# Patient Record
Sex: FEMALE | Race: WHITE | Employment: FULL TIME | URBAN - METROPOLITAN AREA
[De-identification: names, ages, dates, MRNs, and addresses within clinical notes are randomized per-mention and may not be internally consistent; named-entity substitution may affect disease eponyms.]

---

## 2023-06-06 RX ORDER — M-VIT,TX,IRON,MINS/CALC/FOLIC 27MG-0.4MG
1 TABLET ORAL DAILY
COMMUNITY

## 2023-06-06 RX ORDER — VALACYCLOVIR HYDROCHLORIDE 500 MG/1
TABLET, FILM COATED ORAL
COMMUNITY
Start: 2019-11-25

## 2023-06-06 NOTE — PROGRESS NOTES
Preoperative Instructions:    Stop eating solid foods at midnight the night prior to your surgery. Stop drinking clear liquids at midnight the night prior to your surgery. Arrive at the surgery center (3rd entrance) on ____9-2-83___________ by ___0945am or as directed by surgeons office.____________. Please stop any blood thinning medications as directed by your surgeon or prescribing physician. Failure to stop certain medications may interfere with your scheduled surgery. These may include: Aspirin, Coumadin, Plavix, NSAIDS (Motrin, Aleve, Advil, Mobic, Celebrex), Eliquis, Pradaxa, Xarelto, Fish oil, and herbal supplements. You may continue the rest of your medications through the night before surgery unless instructed otherwise. Day of surgery please take only the following medication(s) with a small sip of water: None      Please  shower with Hibiclens or antibacterial soap and water the day before and the morning of  the day of surgery. Reminders:  -If you are going home the day of your procedure, you will need a family member or friend to stay during the procedure and drive you home after your procedure. Your  must be 25years of age or older and able to sign off on your discharge instructions.    -If you are going home the same day of your surgery, someone must remain with you for the first 24 hours after your surgery if you receive sedation or anesthesia.      -Please do not wear any jewelery , lotions, contacts or body piercing the day of surgery

## 2023-06-08 ENCOUNTER — ANESTHESIA EVENT (OUTPATIENT)
Dept: OPERATING ROOM | Age: 58
End: 2023-06-08
Payer: COMMERCIAL

## 2023-06-08 NOTE — H&P
ORTHOPEDIC PREOP HISTORY AND PHYSICAL      HPI / Chief Complaint  Kyle Pompa is a 65 y.o. female who presents for left shoulder pain    Past Medical History  Ty Muhammad  has no past medical history on file. Past Surgical History  Ty Muhammad  has a past surgical history that includes  section; Tubal ligation; Dental surgery; and Colonoscopy. Current Medications  No current facility-administered medications for this encounter. Current Outpatient Medications   Medication Sig Dispense Refill    valACYclovir (VALTREX) 500 MG tablet TAKE 1 TABLET(500 MG) BY MOUTH DAILY      Multiple Vitamins-Minerals (THERAPEUTIC MULTIVITAMIN-MINERALS) tablet Take 1 tablet by mouth daily      Calcium Carbonate Antacid (CALCIUM CARBONATE PO) Take by mouth daily         Allergies  Allergies have been reviewed. Ty Muhammad is allergic to codeine. Social History  Ty Muhammad  reports that she has never smoked. She has never used smokeless tobacco. She reports current alcohol use. She reports that she does not use drugs. Family History  Emeli's family history is not on file. Review of Systems   History obtained from the patient. REVIEW OF SYSTEMS:   Constitution: negative for fever, chills    Physical Exam  Ht 5' 4\" (1.626 m)   Wt 170 lb (77.1 kg)   BMI 29.18 kg/m²    General Appearance: in no distress  HEENT: Normocephalic  CV: brisk cap refill to extremities  Lungs: Nonlabored breathing  Abdomen: soft  Extremities: Left shoulder skin intact    Assessment and Plan  Kyle Pompa is a 65 y.o. old female left shoulder adhesive capsulitis. Plan is for left shoulder manipulation under anesthesia, possible arthroscopic capsular release, labral debridement, possible biceps tenodesis.     This note is created with the assistance of a speech recognition program.  While intending to generate a document that actually reflects the content of the visit, the document can still have some errors including those of

## 2023-06-09 ENCOUNTER — ANESTHESIA (OUTPATIENT)
Dept: OPERATING ROOM | Age: 58
End: 2023-06-09
Payer: COMMERCIAL

## 2023-06-09 ENCOUNTER — HOSPITAL ENCOUNTER (OUTPATIENT)
Age: 58
Setting detail: OUTPATIENT SURGERY
Discharge: HOME | End: 2023-06-09
Attending: ORTHOPAEDIC SURGERY | Admitting: ORTHOPAEDIC SURGERY
Payer: COMMERCIAL

## 2023-06-09 VITALS
HEIGHT: 64 IN | DIASTOLIC BLOOD PRESSURE: 72 MMHG | RESPIRATION RATE: 12 BRPM | SYSTOLIC BLOOD PRESSURE: 104 MMHG | WEIGHT: 173 LBS | HEART RATE: 62 BPM | TEMPERATURE: 96.8 F | BODY MASS INDEX: 29.53 KG/M2 | OXYGEN SATURATION: 97 %

## 2023-06-09 DIAGNOSIS — G89.18 POST-OP PAIN: Primary | ICD-10-CM

## 2023-06-09 DIAGNOSIS — M75.02 ADHESIVE CAPSULITIS OF LEFT SHOULDER: Primary | ICD-10-CM

## 2023-06-09 PROCEDURE — 2580000003 HC RX 258: Performed by: ORTHOPAEDIC SURGERY

## 2023-06-09 PROCEDURE — 6360000002 HC RX W HCPCS

## 2023-06-09 PROCEDURE — 6360000002 HC RX W HCPCS: Performed by: ORTHOPAEDIC SURGERY

## 2023-06-09 PROCEDURE — 6360000002 HC RX W HCPCS: Performed by: ANESTHESIOLOGY

## 2023-06-09 PROCEDURE — 2500000003 HC RX 250 WO HCPCS: Performed by: NURSE ANESTHETIST, CERTIFIED REGISTERED

## 2023-06-09 PROCEDURE — 2580000003 HC RX 258: Performed by: ANESTHESIOLOGY

## 2023-06-09 PROCEDURE — 64415 NJX AA&/STRD BRCH PLXS IMG: CPT | Performed by: ANESTHESIOLOGY

## 2023-06-09 PROCEDURE — 2500000003 HC RX 250 WO HCPCS: Performed by: ANESTHESIOLOGY

## 2023-06-09 PROCEDURE — 6360000002 HC RX W HCPCS: Performed by: NURSE ANESTHETIST, CERTIFIED REGISTERED

## 2023-06-09 PROCEDURE — 6370000000 HC RX 637 (ALT 250 FOR IP)

## 2023-06-09 PROCEDURE — C9290 INJ, BUPIVACAINE LIPOSOME: HCPCS | Performed by: ANESTHESIOLOGY

## 2023-06-09 RX ORDER — SODIUM CHLORIDE 0.9 % (FLUSH) 0.9 %
5-40 SYRINGE (ML) INJECTION PRN
Status: DISCONTINUED | OUTPATIENT
Start: 2023-06-09 | End: 2023-06-09 | Stop reason: HOSPADM

## 2023-06-09 RX ORDER — BUPIVACAINE HYDROCHLORIDE 2.5 MG/ML
INJECTION, SOLUTION EPIDURAL; INFILTRATION; INTRACAUDAL
Status: COMPLETED | OUTPATIENT
Start: 2023-06-09 | End: 2023-06-09

## 2023-06-09 RX ORDER — NEOSTIGMINE METHYLSULFATE 5 MG/5 ML
SYRINGE (ML) INTRAVENOUS PRN
Status: DISCONTINUED | OUTPATIENT
Start: 2023-06-09 | End: 2023-06-09 | Stop reason: SDUPTHER

## 2023-06-09 RX ORDER — KETOROLAC TROMETHAMINE 30 MG/ML
INJECTION, SOLUTION INTRAMUSCULAR; INTRAVENOUS PRN
Status: DISCONTINUED | OUTPATIENT
Start: 2023-06-09 | End: 2023-06-09 | Stop reason: SDUPTHER

## 2023-06-09 RX ORDER — PROPOFOL 10 MG/ML
INJECTION, EMULSION INTRAVENOUS PRN
Status: DISCONTINUED | OUTPATIENT
Start: 2023-06-09 | End: 2023-06-09 | Stop reason: SDUPTHER

## 2023-06-09 RX ORDER — FENTANYL CITRATE 50 UG/ML
INJECTION, SOLUTION INTRAMUSCULAR; INTRAVENOUS PRN
Status: DISCONTINUED | OUTPATIENT
Start: 2023-06-09 | End: 2023-06-09 | Stop reason: SDUPTHER

## 2023-06-09 RX ORDER — FENTANYL CITRATE 50 UG/ML
INJECTION, SOLUTION INTRAMUSCULAR; INTRAVENOUS
Status: COMPLETED
Start: 2023-06-09 | End: 2023-06-09

## 2023-06-09 RX ORDER — GLYCOPYRROLATE 0.2 MG/ML
INJECTION INTRAMUSCULAR; INTRAVENOUS PRN
Status: DISCONTINUED | OUTPATIENT
Start: 2023-06-09 | End: 2023-06-09 | Stop reason: SDUPTHER

## 2023-06-09 RX ORDER — SODIUM CHLORIDE 0.9 % (FLUSH) 0.9 %
5-40 SYRINGE (ML) INJECTION EVERY 12 HOURS SCHEDULED
Status: DISCONTINUED | OUTPATIENT
Start: 2023-06-09 | End: 2023-06-09 | Stop reason: HOSPADM

## 2023-06-09 RX ORDER — DEXAMETHASONE SODIUM PHOSPHATE 10 MG/ML
10 INJECTION, SOLUTION INTRAMUSCULAR; INTRAVENOUS ONCE
Status: COMPLETED | OUTPATIENT
Start: 2023-06-09 | End: 2023-06-09

## 2023-06-09 RX ORDER — METOCLOPRAMIDE HYDROCHLORIDE 5 MG/ML
10 INJECTION INTRAMUSCULAR; INTRAVENOUS
Status: DISCONTINUED | OUTPATIENT
Start: 2023-06-09 | End: 2023-06-09 | Stop reason: HOSPADM

## 2023-06-09 RX ORDER — ACETAMINOPHEN 500 MG
1000 TABLET ORAL ONCE
Status: COMPLETED | OUTPATIENT
Start: 2023-06-09 | End: 2023-06-09

## 2023-06-09 RX ORDER — MEPERIDINE HYDROCHLORIDE 50 MG/ML
12.5 INJECTION INTRAMUSCULAR; INTRAVENOUS; SUBCUTANEOUS ONCE
Status: DISCONTINUED | OUTPATIENT
Start: 2023-06-09 | End: 2023-06-09 | Stop reason: HOSPADM

## 2023-06-09 RX ORDER — SODIUM CHLORIDE 9 MG/ML
INJECTION, SOLUTION INTRAVENOUS PRN
Status: DISCONTINUED | OUTPATIENT
Start: 2023-06-09 | End: 2023-06-09 | Stop reason: HOSPADM

## 2023-06-09 RX ORDER — BUPIVACAINE HYDROCHLORIDE 2.5 MG/ML
INJECTION, SOLUTION EPIDURAL; INFILTRATION; INTRACAUDAL
Status: COMPLETED
Start: 2023-06-09 | End: 2023-06-09

## 2023-06-09 RX ORDER — LIDOCAINE HYDROCHLORIDE 10 MG/ML
INJECTION, SOLUTION INFILTRATION; PERINEURAL PRN
Status: DISCONTINUED | OUTPATIENT
Start: 2023-06-09 | End: 2023-06-09 | Stop reason: SDUPTHER

## 2023-06-09 RX ORDER — OXYCODONE HYDROCHLORIDE 5 MG/1
10 TABLET ORAL PRN
Status: DISCONTINUED | OUTPATIENT
Start: 2023-06-09 | End: 2023-06-09 | Stop reason: HOSPADM

## 2023-06-09 RX ORDER — SODIUM CHLORIDE, SODIUM LACTATE, POTASSIUM CHLORIDE, CALCIUM CHLORIDE 600; 310; 30; 20 MG/100ML; MG/100ML; MG/100ML; MG/100ML
INJECTION, SOLUTION INTRAVENOUS CONTINUOUS
Status: DISCONTINUED | OUTPATIENT
Start: 2023-06-09 | End: 2023-06-09 | Stop reason: HOSPADM

## 2023-06-09 RX ORDER — MORPHINE SULFATE 2 MG/ML
1 INJECTION, SOLUTION INTRAMUSCULAR; INTRAVENOUS EVERY 5 MIN PRN
Status: DISCONTINUED | OUTPATIENT
Start: 2023-06-09 | End: 2023-06-09 | Stop reason: HOSPADM

## 2023-06-09 RX ORDER — ACETAMINOPHEN 500 MG
TABLET ORAL
Status: COMPLETED
Start: 2023-06-09 | End: 2023-06-09

## 2023-06-09 RX ORDER — OXYCODONE HYDROCHLORIDE AND ACETAMINOPHEN 5; 325 MG/1; MG/1
1-2 TABLET ORAL EVERY 6 HOURS PRN
Qty: 40 TABLET | Refills: 0 | Status: SHIPPED | OUTPATIENT
Start: 2023-06-09 | End: 2023-06-16

## 2023-06-09 RX ORDER — ONDANSETRON 2 MG/ML
4 INJECTION INTRAMUSCULAR; INTRAVENOUS
Status: DISCONTINUED | OUTPATIENT
Start: 2023-06-09 | End: 2023-06-09 | Stop reason: HOSPADM

## 2023-06-09 RX ORDER — MIDAZOLAM HYDROCHLORIDE 1 MG/ML
INJECTION INTRAMUSCULAR; INTRAVENOUS
Status: COMPLETED
Start: 2023-06-09 | End: 2023-06-09

## 2023-06-09 RX ORDER — IBUPROFEN 800 MG/1
800 TABLET ORAL
Qty: 90 TABLET | Refills: 0 | Status: SHIPPED | OUTPATIENT
Start: 2023-06-09

## 2023-06-09 RX ORDER — OXYCODONE HYDROCHLORIDE 5 MG/1
5 TABLET ORAL PRN
Status: DISCONTINUED | OUTPATIENT
Start: 2023-06-09 | End: 2023-06-09 | Stop reason: HOSPADM

## 2023-06-09 RX ORDER — ONDANSETRON 2 MG/ML
INJECTION INTRAMUSCULAR; INTRAVENOUS PRN
Status: DISCONTINUED | OUTPATIENT
Start: 2023-06-09 | End: 2023-06-09 | Stop reason: SDUPTHER

## 2023-06-09 RX ORDER — LIDOCAINE HYDROCHLORIDE 10 MG/ML
1 INJECTION, SOLUTION INFILTRATION; PERINEURAL
Status: DISCONTINUED | OUTPATIENT
Start: 2023-06-09 | End: 2023-06-09 | Stop reason: HOSPADM

## 2023-06-09 RX ORDER — ONDANSETRON 4 MG/1
4 TABLET, FILM COATED ORAL EVERY 6 HOURS PRN
Qty: 21 TABLET | Refills: 0 | Status: SHIPPED | OUTPATIENT
Start: 2023-06-09 | End: 2023-06-16

## 2023-06-09 RX ORDER — DEXAMETHASONE SODIUM PHOSPHATE 10 MG/ML
INJECTION, SOLUTION INTRAMUSCULAR; INTRAVENOUS
Status: COMPLETED
Start: 2023-06-09 | End: 2023-06-09

## 2023-06-09 RX ORDER — DIPHENHYDRAMINE HYDROCHLORIDE 50 MG/ML
12.5 INJECTION INTRAMUSCULAR; INTRAVENOUS
Status: DISCONTINUED | OUTPATIENT
Start: 2023-06-09 | End: 2023-06-09 | Stop reason: HOSPADM

## 2023-06-09 RX ORDER — CEFAZOLIN 2 G/1
INJECTION, POWDER, FOR SOLUTION INTRAMUSCULAR; INTRAVENOUS
Status: DISCONTINUED
Start: 2023-06-09 | End: 2023-06-09 | Stop reason: HOSPADM

## 2023-06-09 RX ORDER — ROCURONIUM BROMIDE 10 MG/ML
INJECTION, SOLUTION INTRAVENOUS PRN
Status: DISCONTINUED | OUTPATIENT
Start: 2023-06-09 | End: 2023-06-09 | Stop reason: SDUPTHER

## 2023-06-09 RX ORDER — HYDRALAZINE HYDROCHLORIDE 20 MG/ML
10 INJECTION INTRAMUSCULAR; INTRAVENOUS
Status: DISCONTINUED | OUTPATIENT
Start: 2023-06-09 | End: 2023-06-09 | Stop reason: HOSPADM

## 2023-06-09 RX ADMIN — FENTANYL CITRATE 100 MCG: 50 INJECTION, SOLUTION INTRAMUSCULAR; INTRAVENOUS at 11:09

## 2023-06-09 RX ADMIN — ACETAMINOPHEN 1000 MG: 500 TABLET ORAL at 10:43

## 2023-06-09 RX ADMIN — Medication 5 MG: at 13:30

## 2023-06-09 RX ADMIN — KETOROLAC TROMETHAMINE 30 MG: 30 INJECTION, SOLUTION INTRAMUSCULAR; INTRAVENOUS at 13:25

## 2023-06-09 RX ADMIN — BUPIVACAINE HYDROCHLORIDE 10 ML: 2.5 INJECTION, SOLUTION EPIDURAL; INFILTRATION; INTRACAUDAL; PERINEURAL at 11:13

## 2023-06-09 RX ADMIN — FENTANYL CITRATE 50 MCG: 50 INJECTION, SOLUTION INTRAMUSCULAR; INTRAVENOUS at 12:41

## 2023-06-09 RX ADMIN — SODIUM CHLORIDE, POTASSIUM CHLORIDE, SODIUM LACTATE AND CALCIUM CHLORIDE: 600; 310; 30; 20 INJECTION, SOLUTION INTRAVENOUS at 10:56

## 2023-06-09 RX ADMIN — FENTANYL CITRATE 50 MCG: 50 INJECTION, SOLUTION INTRAMUSCULAR; INTRAVENOUS at 13:44

## 2023-06-09 RX ADMIN — MIDAZOLAM HYDROCHLORIDE 2 MG: 1 INJECTION, SOLUTION INTRAMUSCULAR; INTRAVENOUS at 11:09

## 2023-06-09 RX ADMIN — DEXAMETHASONE SODIUM PHOSPHATE 10 MG: 10 INJECTION, SOLUTION INTRAMUSCULAR; INTRAVENOUS at 10:56

## 2023-06-09 RX ADMIN — DEXAMETHASONE SODIUM PHOSPHATE 10 MG: 10 INJECTION INTRAMUSCULAR; INTRAVENOUS at 10:56

## 2023-06-09 RX ADMIN — GLYCOPYRROLATE 0.4 MG: 0.2 INJECTION INTRAMUSCULAR; INTRAVENOUS at 13:30

## 2023-06-09 RX ADMIN — ROCURONIUM BROMIDE 40 MG: 10 INJECTION, SOLUTION INTRAVENOUS at 12:41

## 2023-06-09 RX ADMIN — ONDANSETRON 4 MG: 2 INJECTION INTRAMUSCULAR; INTRAVENOUS at 13:25

## 2023-06-09 RX ADMIN — BUPIVACAINE 10 ML: 13.3 INJECTION, SUSPENSION, LIPOSOMAL INFILTRATION at 11:13

## 2023-06-09 RX ADMIN — Medication 1000 MG: at 10:43

## 2023-06-09 RX ADMIN — CEFAZOLIN 2000 MG: 2 INJECTION, POWDER, FOR SOLUTION INTRAMUSCULAR; INTRAVENOUS at 12:53

## 2023-06-09 RX ADMIN — LIDOCAINE HYDROCHLORIDE 40 MG: 10 INJECTION, SOLUTION INFILTRATION; PERINEURAL at 12:41

## 2023-06-09 RX ADMIN — PROPOFOL 150 MG: 10 INJECTION, EMULSION INTRAVENOUS at 12:41

## 2023-06-09 ASSESSMENT — PAIN DESCRIPTION - DESCRIPTORS: DESCRIPTORS: ACHING

## 2023-06-09 ASSESSMENT — PAIN - FUNCTIONAL ASSESSMENT: PAIN_FUNCTIONAL_ASSESSMENT: 0-10

## 2023-06-09 NOTE — OP NOTE
Operative Note      Patient: Kelsey Traylor  YOB: 1965  MRN: [id number]    Date of Procedure: 6/9/2023    Pre-Op Diagnosis Codes:     * Adhesive capsulitis of left shoulder [M75.02]     * Degenerative superior labral anterior-to-posterior (SLAP) tear of left shoulder [S43.432A]    Post-Op Diagnosis: Adhesive capsulitis left shoulder       Procedure(s):  LEFT SHOULDER MANIPULATION UNDER ANESTHESIA WITH PRE-OP CPNB   ARTHROSCOPIC CAPSULAR RELEASE      Surgeon(s):  Delaney Romero MD    Assistant:   First Assistant: Wm Lind RN    Anesthesia: General    Estimated Blood Loss (mL): Minimal    Complications: None    Specimens:   * No specimens in log *    Implants:  * No implants in log *      Drains: * No LDAs found *    Findings: See below      Detailed Description of Procedure:   Informed consent was obtained. Marked her left shoulder. She received a preoperative regional nerve block. She was brought back to the operating room where general anesthesia was smoothly induced. She was placed in the beachchair position. The left arm was prepped and draped in normal sterile fashion. I performed an examination under anesthesia and her preoperative forward elevation and abduction was limited to 90 degrees. Even just with a gentle exam and positioning her I was able to feel inflamed capsular tissue tearing. A timeout is performed. She did receive prophylactic antibiotics. I made a standard posterior viewing portal in the glenohumeral joint. I immediately saw significant inflamed anterior capsular tissue. The shoulder was tight. I performed a diagnostic arthroscopy. Her biceps tendon was of good quality and was intact at the superior labral anchor. The superior labrum itself was intact and there was no instability of the biceps anchor. The articular cartilage of the glenoid and humeral head was intact. I could not see the subscapularis anteriorly due to significant inflamed tissue.   The

## 2023-06-09 NOTE — ANESTHESIA POSTPROCEDURE EVALUATION
POST- ANESTHESIA EVALUATION       Pt Name: Tucker Lloyd  MRN: [id number]  [place]: 1965  Date of evaluation: 6/9/2023  Time:  2:11 PM      /78   Pulse 60   Temp 96.8 °F (36 °C) (Temporal)   Resp 18   Ht 5' 4\" (1.626 m)   Wt 173 lb (78.5 kg)   SpO2 99%   BMI 29.70 kg/m²      Consciousness Level  Awake  Cardiopulmonary Status  Stable  Pain Adequately Treated YES  Nausea / Vomiting  NO  Adequate Hydration  YES  Anesthesia Related Complications NONE      Electronically signed by Tobias Jean MD on 6/9/2023 at 2:11 PM       Department of Anesthesiology  Postprocedure Note    Patient: Tucker Lloyd  MRN: [id number]  [place]: 1965  Date of evaluation: 6/9/2023      Procedure Summary     Date: 06/09/23 Room / Location: [facility] OR 02 / [address] [facility]    Anesthesia Start: 4425 Anesthesia Stop: 1848    Procedure: LEFT SHOULDER MANIPULATION UNDER ANESTHESIA WITH PRE-OP CPNB   ARTHROSCOPIC CAPSULAR RELEASE   (Left: Shoulder) Diagnosis:       Adhesive capsulitis of left shoulder      Degenerative superior labral anterior-to-posterior (SLAP) tear of left shoulder      (Adhesive capsulitis of left shoulder [M75.02])      (Degenerative superior labral anterior-to-posterior (SLAP) tear of left shoulder [H59.862F])    Surgeons: Jesus Atwood MD Responsible Provider: Tobias Jean MD    Anesthesia Type: general ASA Status: 1          Anesthesia Type: No value filed.     Marisa Phase I: Marisa Score: 8    Marisa Phase II:        Anesthesia Post Evaluation

## 2023-06-09 NOTE — ANESTHESIA PROCEDURE NOTES
Peripheral Block    Patient location during procedure: pre-op  Reason for block: post-op pain management and at surgeon's request  Start time: 6/9/2023 11:13 AM  End time: 6/9/2023 11:17 AM  Staffing  Performed: anesthesiologist   Anesthesiologist: Silvano Cain MD  Preanesthetic Checklist  Completed: patient identified, IV checked, site marked, risks and benefits discussed, surgical/procedural consents, equipment checked, pre-op evaluation, timeout performed, anesthesia consent given, oxygen available, monitors applied/VS acknowledged, fire risk safety assessment completed and verbalized and blood product R/B/A discussed and consented  Peripheral Block   Patient position: supine  Prep: ChloraPrep  Provider prep: mask and sterile gloves  Patient monitoring: cardiac monitor, continuous pulse ox, frequent blood pressure checks, IV access, oxygen and responsive to questions  Block type: Brachial plexus  Interscalene  Laterality: left  Injection technique: single-shot  Guidance: transarterial technique  Local infiltration: bupivacaine  Infiltration strength: 0.25 %  Local infiltration: bupivacaine  Dose: 2 mL    Needle   Needle type: insulated echogenic nerve stimulator needle   Needle gauge: 22 G  Needle localization: anatomical landmarks and ultrasound guidance  Needle insertion depth: 2 cm  Test dose: negative  Needle length: 5 cm  Assessment   Injection assessment: negative aspiration for heme, no paresthesia on injection, local visualized surrounding nerve on ultrasound and no intravascular symptoms  Paresthesia pain: none  Slow fractionated injection: yes  Hemodynamics: stable  Outcomes: uncomplicated and patient tolerated procedure well    Medications Administered  bupivacaine (MARCAINE) PF injection 0.25% - Interscalene, Shoulder Left   10 mL - 6/9/2023 11:13:00 AM  bupivacaine liposome (EXPAREL) injection 1.3% - Perineural, Shoulder Left   10 mL - 6/9/2023 11:13:00 AM

## 2023-06-09 NOTE — ANESTHESIA PRE PROCEDURE
HGB, HCT, MCV, RDW, PLT    CMP: No results found for: NA, K, CL, CO2, BUN, CREATININE, GFRAA, AGRATIO, LABGLOM, GLUCOSE, GLU, PROT, CALCIUM, BILITOT, ALKPHOS, AST, ALT    POC Tests: No results for input(s): POCGLU, POCNA, POCK, POCCL, POCBUN, POCHEMO, POCHCT in the last 72 hours. Coags: No results found for: PROTIME, INR, APTT    HCG (If Applicable): No results found for: PREGTESTUR, PREGSERUM, HCG, HCGQUANT     ABGs: No results found for: PHART, PO2ART, NNU5YDM, DBK1DQT, BEART, A6GBRSMO     Type & Screen (If Applicable):  No results found for: LABABO, LABRH    Drug/Infectious Status (If Applicable):  No results found for: HIV, HEPCAB    COVID-19 Screening (If Applicable): No results found for: COVID19        Anesthesia Evaluation  Patient summary reviewed and Nursing notes reviewed no history of anesthetic complications:   Airway: Mallampati: II  TM distance: >3 FB   Neck ROM: full  Mouth opening: > = 3 FB   Dental: normal exam         Pulmonary:Negative Pulmonary ROS and normal exam  breath sounds clear to auscultation                             Cardiovascular:Negative CV ROS  Exercise tolerance: no interval change,         ECG reviewed  Rhythm: regular  Rate: normal                    Neuro/Psych:   Negative Neuro/Psych ROS              GI/Hepatic/Renal: Neg GI/Hepatic/Renal ROS            Endo/Other: Negative Endo/Other ROS                     ROS comment: Adhesive capsulitis of left shoulder  Degenerative superior labral anterior-to-posterior (SLAP) tear of left shoulder  Abdominal: normal exam      Abdomen: soft. Vascular: negative vascular ROS. Other Findings:           Anesthesia Plan      general     ASA 1     (GA and interscalene nerve block)  Induction: intravenous. MIPS: Postoperative opioids intended and Prophylactic antiemetics administered. Anesthetic plan and risks discussed with patient. Plan discussed with CRNA.           Post-op pain plan if not by surgeon: single

## 2023-06-09 NOTE — DISCHARGE INSTRUCTIONS
Wear the sling until your nerve block wears off and you regain control of your arm. Then try to wear the sling as little as possible. Work on range of motion multiple times a day doing passive range of motion exercises including cane exercises raising the arm overhead. Get into physical therapy as soon as possible. I will see in 2 weeks. Activity  You have had anesthesia today  Do not drive, operate heavy equipment, consume alcoholic beverages, or make any important decisions  for 24 hours   If you are taking pain medication: Do not drive or consume alcohol. Take your time changing positions today. You may feel light headed or dizzy if you move too quickly. Continue your home medications as ordered by your physician. Diet   You can eat your normal diet when you feel well. You should start off with bland foods like chicken soup, toast, or yogurt. Then advance as tolerated. Drink plenty of fluids (unless your doctor tells you not to). Your urine should be very lightly colored without a strong odor.

## 2023-06-19 ENCOUNTER — HOSPITAL ENCOUNTER (OUTPATIENT)
Dept: PHYSICAL THERAPY | Facility: CLINIC | Age: 58
Setting detail: THERAPIES SERIES
Discharge: HOME | End: 2023-06-19
Payer: COMMERCIAL

## 2023-06-19 PROCEDURE — 97016 VASOPNEUMATIC DEVICE THERAPY: CPT

## 2023-06-19 PROCEDURE — 97110 THERAPEUTIC EXERCISES: CPT

## 2023-06-19 NOTE — FLOWSHEET NOTE
stiff\", but denies any increased pain after last session. Objective:  Modalities: MHP pre, vaso to L shoulder: 34 deg, med seated  post   Precautions: s/p L shoulder capsular release, NEHAL 6/12/23 for [facility]  Exercises: pt to be seen every day for x2 weeks to start   Exercise Reps/ Time Weight/ Level Comments COMPLETED   L GHJ AP glides in neutral, inferior glides @ <45 deg, long-axis distraction pre PROM       x   L shoulder PROM- all motions x20 ea     x               Gym           Circular Pendulum  15x ea     x   Shoulder ext 2x10  W/dowel x   Pulleys 2' ea  Flex,abd x   Wall Slide 10x ea  Flex,ABD x   Standing wand flex and abd 10xea 1# Added 6/19 x   ER with wand 5\"X10             Seated       Scapular retraction 2x10      Towel slides 15x  Flexion,scaption, abd           Supine       Shoulder flexion 2x10  wand x   ER 2x10  wand x                                 Specific Instructions for next treatment: assess tolerance to last visit, progress ROM activities as tolerated. Treatment Charges: Mins Units   [x]  Modalities MHP 5 0   [x]  Ther Exercise 30 2   []  Manual Therapy     []  Ther Activities     []  Neuro Re-ed     [x]  Vasocompression 15 1   [] Gait     []  Other     Total Treatment time 45 3     Assessment: [x] Progressing toward goals. Again began treatment with 5' of MHP to decrease stiffness prior to exercises. Continued with pulleys followed by wall slides with good tolerance. Progressed to standing wand flex and abd, as well as abd in supine, with most difficulty during abd. Continued with PROM, implementing contract/relax for ER with improvement in ER noted post. Ended with vasocompression to decrease post exercise soreness and edema. [] No change.      [] Other:  [x] Patient would continue to benefit from skilled physical therapy services in order to: inc L shoulder A/PROM; inc L RTC gross strength; and overall improve tolerance for utilizing L UE in ADLs- donning/doffing clothing etc.

## 2023-06-20 ENCOUNTER — HOSPITAL ENCOUNTER (OUTPATIENT)
Dept: PHYSICAL THERAPY | Facility: CLINIC | Age: 58
Setting detail: THERAPIES SERIES
Discharge: HOME | End: 2023-06-20
Payer: COMMERCIAL

## 2023-06-20 PROCEDURE — 97110 THERAPEUTIC EXERCISES: CPT

## 2023-06-20 NOTE — FLOWSHEET NOTE
[] [facility] [address]  [address]  [phone]  F: [phone] [] [address]  [facility]   [address]  P: [phone]  F: [phone] [] [address]  [address] [facility]  P: [phone]  F: [phone] [x] [address] [facility] [address] [facility] [address]: [phone]  F: [phone] [] [address]  [facility]   [address]   P: [phone]  F: [phone]  [] [address] [facility] [place] [facility].   P: [phone]  F: [phone] [] [address] [facility] [address]  P: [phone]  F: [phone] [] [address]  [address] [facility]  [place]: [phone]  F: [phone] [] [facility] [address]  [place]: [phone]  F: [phone]      Physical Therapy Daily Treatment Note    Date:  2023  Patient Name:  Martinez Green    :  1965  MRN: [id number]  Physician: Demetria Sapp MD                              Insurance: [address] [place] [address] Diagnosis: M75.02 (ICD-10-CM) - Adhesive capsulitis of left shoulder                  Rehab Codes: M75.02 (ICD-10-CM) - Adhesive capsulitis of left shoulder  M25.512; M62.81  Onset Date: 23                 Next 's appt: 23   Visit# / total visits:      Cancels/No Shows: 0/0    Subjective:    Pain:  [x] Yes  [] No Location: Left shoulder  Pain Rating: (0-10 scale) 0/10 at rest, 3/10 during mobility  Pain altered Tx:  [x] No  [] Yes  Action:   Comments: Pt reports overall L shld has been feeling better since beginning PT.

## 2023-06-21 ENCOUNTER — APPOINTMENT (OUTPATIENT)
Dept: PHYSICAL THERAPY | Facility: CLINIC | Age: 58
End: 2023-06-21
Payer: COMMERCIAL

## 2023-06-22 ENCOUNTER — HOSPITAL ENCOUNTER (OUTPATIENT)
Dept: PHYSICAL THERAPY | Facility: CLINIC | Age: 58
Setting detail: THERAPIES SERIES
Discharge: HOME | End: 2023-06-22
Payer: COMMERCIAL

## 2023-06-22 PROCEDURE — 97110 THERAPEUTIC EXERCISES: CPT

## 2023-06-22 NOTE — FLOWSHEET NOTE
[] [facility] [address]  [address]  [phone]  F: [phone] [] [address]  [facility]   [address]  P: [phone]  F: [phone] [] [address]  [address] [facility]  P: [phone]  F: [phone] [x] [address] [facility] [address]: [phone]  F: [phone] [] [address]  [facility]   [address]   P: [phone]  F: [phone]  [] [address] [facility]   P: [phone]  F: [phone] [] [address] [facility]   [address]  P: [phone]  F: [phone] [] [address]  [address] [facility]  [place]: [phone]  F: [phone] [] [facility] [address]   [place]: [phone]  F: [phone]      Physical Therapy Daily Treatment Note    Date:  2023  Patient Name:  Afia Arshad    :  1965  MRN: [id number]  Physician: Janneth Rich MD                              Insurance: [address] [place] [address] Diagnosis: M75.02 (ICD-10-CM) - Adhesive capsulitis of left shoulder                  Rehab Codes: M75.02 (ICD-10-CM) - Adhesive capsulitis of left shoulder  M25.512; M62.81  Onset Date: 23                 Next 's appt: 23   Visit# / total visits:      Cancels/No Shows: 0/0    Subjective:    Pain:  [x] Yes  [] No Location: Left shoulder  Pain Rating: (0-10 scale) 0/10 at rest, 3/10 during mobility  Pain altered Tx:  [x] No  [] Yes  Action:   Comments: Pt reported that her doctor is pleased with her progress, noted she

## 2023-06-23 ENCOUNTER — APPOINTMENT (OUTPATIENT)
Dept: PHYSICAL THERAPY | Facility: CLINIC | Age: 58
End: 2023-06-23
Payer: COMMERCIAL

## 2023-06-26 ENCOUNTER — HOSPITAL ENCOUNTER (OUTPATIENT)
Dept: PHYSICAL THERAPY | Facility: CLINIC | Age: 58
Setting detail: THERAPIES SERIES
Discharge: HOME | End: 2023-06-26
Payer: COMMERCIAL

## 2023-06-26 PROCEDURE — 97110 THERAPEUTIC EXERCISES: CPT

## 2023-06-29 ENCOUNTER — HOSPITAL ENCOUNTER (OUTPATIENT)
Dept: PHYSICAL THERAPY | Facility: CLINIC | Age: 58
Setting detail: THERAPIES SERIES
Discharge: HOME | End: 2023-06-29
Payer: COMMERCIAL

## 2023-06-29 PROCEDURE — 97110 THERAPEUTIC EXERCISES: CPT

## 2023-07-05 ENCOUNTER — HOSPITAL ENCOUNTER (OUTPATIENT)
Dept: PHYSICAL THERAPY | Facility: CLINIC | Age: 58
Setting detail: THERAPIES SERIES
Discharge: HOME | End: 2023-07-05
Payer: COMMERCIAL

## 2023-07-05 PROCEDURE — 97110 THERAPEUTIC EXERCISES: CPT

## 2023-07-05 NOTE — FLOWSHEET NOTE
mobility, sometime last week- attributes to \"beginning strength training. \"  Reports constant ache- even at rest- since the flare-up began. However, also reports only utilizing x2 ibuprofen each day as of recent- as compared to x3 per day. Plans to follow-up with referring MD 7/20/23- however- may call to reschedule follow-up for sooner. Utilized cryotherapy a few times while at home since flare-up. Only performed wall-slides and pulleys over the weekend per instruction of PT.      Objective:  Modalities: MHP to L shoulder in supine for x5' mid-session prior to PROM; HELD VASO- 7/5/23   Precautions: s/p L shoulder capsular release, NEHAL 6/12/23 for [facility] [place] operated by [facility]; x2 week for remainder of PT POC- starting 7/5/23   Exercises:  Exercise Reps/ Time Weight/ Level Comments COMPLETED   Goal update, UEFS, HEP/PT POC education     x          L GHJ AP glides in neutral, inferior glides @ <45 deg, long-axis distraction pre PROM     MAY RESUME NEXT VISIT -   L shoulder PROM- all motions x10 ea     x               Gym           Circular Pendulum 15x ea     -   UBE  x3'/x3' L2  x   Shoulder ext 2x10  W/dowel -   AAROM Pulleys x2' ea  Flex, scap, abd  Cues for slow, controlled motion + hold at end-ranges  x   Seated SB roll-outs  x10 ea Large red NEW- 7/5/23  FLEX/HABD/HADD x   Wall Slide 10x ea  Flex,ABD -   Standing AAROM wand FLEX/SCAPT/ABD 2x10 ea 1# NEW- 7/5/23   Mirror for visual feedback x   ER with wand 5\"X10   -   IR towel str  3x30\"   HELD   Tband extensions, rows x20 ea Red tubing  NEW- 7/5/23   Cues to reduce HABD, for in turn, dec biceps P x    TB ER/IR iso walk-out  x10 ea Red tubing  NEW- 7/5/23  Reports appropriate burn  x   IYT 10x ea   HELD          Seated       Scapular retraction 2x10   -   Towel slides 15x  Flexion,scaption, abd -          Supine       Shoulder flexion 2x10  wand -   ER 2x10  wand -   1/2 foam roller pec stretch  2x1'  T 2x, Y only 1x HELD  Pt reported sig P/symptoms             L shoulder PROM:

## 2023-07-05 NOTE — PROGRESS NOTES
[] [facility] - [facility] &  Therapy  [address]  [phone]  F: [phone] [] [address] [facility]  [address]   [address]  P: [phone]  F: [phone] [] [address] &  Therapy  [address] [facility]  P: [phone]  F: [phone] [x] [facility]  P: [phone]  F: [phone] [] [address] [facility]  [address]   [address]   [place]: [phone]  F: [phone]      Physical Therapy Progress Note    Date: 2023      Patient: Sarah Ornelas  : 1965  MRN: [id number]    Physician: Patrick Godinez MD                              Insurance: [address] [place] [address] Diagnosis: M75.02 (ICD-10-CM) - Adhesive capsulitis of left shoulder                  Rehab Codes: M75.02 (ICD-10-CM) - Adhesive capsulitis of left shoulder  M25.512; M62.81  Onset Date: 23                 Next 's appt: 23   Visit# / total visits:                     Cancels/No Shows: 0/0    Subjective:    Pain:  [x] Yes  [] No   Location: L GHJ- generalized             Pain Rating: (0-10 scale) 2/10 at rest  Pain altered Tx:  [x] No  [] Yes  Action: N/A  Comments:      Reports experiencing a flare-up in her symptoms, with then limited functional mobility, sometime last week- attributes to \"beginning strength training. \"  Reports constant ache- even at rest- since the flare-up began. However, also reports only utilizing x2 ibuprofen each day as of recent- as compared to x3 per day. Plans to follow-up with referring MD 23- however- may call to reschedule follow-up for sooner. Utilized cryotherapy a few times while at home since flare-up. Only performed wall-slides and pulleys over the weekend per instruction of PT.      Assessment:  Pt presents with trace

## 2023-07-07 ENCOUNTER — HOSPITAL ENCOUNTER (OUTPATIENT)
Dept: PHYSICAL THERAPY | Facility: CLINIC | Age: 58
Setting detail: THERAPIES SERIES
Discharge: HOME | End: 2023-07-07
Payer: COMMERCIAL

## 2023-07-07 PROCEDURE — 97110 THERAPEUTIC EXERCISES: CPT

## 2023-07-07 NOTE — FLOWSHEET NOTE
Verbalizes understanding. [] Demonstrates understanding. [] Needs review. [] Demonstrates/verbalizes HEP/Ed previously given. Access Code: [email or web address]  URL: [email or web address]  Date: 06/12/2023  Prepared by: Grover Moreira     Exercises  - Circular Shoulder Pendulum with Table Support  - 3 x daily - 7 x weekly - 1 sets - 10 reps  - Seated Scapular Retraction  - 3 x daily - 7 x weekly - 2 sets - 10 reps - 5 sec hold  - Seated Bilateral Shoulder Flexion Towel Slide at Table Top  - 3 x daily - 7 x weekly - 2 sets - 10 reps  - Seated Shoulder Scaption Slide at Table Top with Forearm in Neutral  - 3 x daily - 7 x weekly - 2 sets - 10 reps  - Seated Shoulder Abduction Towel Slide at Table Top  - 3 x daily - 7 x weekly - 2 sets - 10 reps  - Standing Shoulder Extension with Dowel  - 3 x daily - 7 x weekly - 2 sets - 10 reps  - Supine Shoulder Flexion with Dowel  - 3 x daily - 7 x weekly - 2 sets - 10 reps  - Supine Shoulder External Rotation in 45 Degrees Abduction AAROM with Dowel  - 3 x daily - 7 x weekly - 2 sets - 10 reps  - Putty Squeezes  - 3 x daily - 7 x weekly - 2 sets - 10 reps    7/5/23- See grid above for details. Plan: [x] Continue current frequency toward long and short term goals. [x] Specific Instructions for subsequent treatments: Follow-up with pt regarding tolerance to last, modified treatment session- continue if proven beneficial next visit. Abstain from foam roller pec stretching, as well as more advanced scapular strengthening prone on mat table or over SB for now secondary to poor pt tolerance. Continue gentle strengthening and resume joint mobs, vaso next visit.      Frequency: 3 x/week for 24 visits      Time In: 4:00pm      Time Out: 4:40pm    Electronically signed by:  Wen Gore PTA

## 2023-07-10 ENCOUNTER — HOSPITAL ENCOUNTER (OUTPATIENT)
Dept: PHYSICAL THERAPY | Facility: CLINIC | Age: 58
Setting detail: THERAPIES SERIES
Discharge: HOME | End: 2023-07-10
Payer: COMMERCIAL

## 2023-07-10 PROCEDURE — 97110 THERAPEUTIC EXERCISES: CPT

## 2023-07-10 NOTE — FLOWSHEET NOTE
[] [address]  4600 [facility].  [phone]  F: [phone] [] 204 [facility]  [address]   [address]  P: [phone]  F: [phone] [] [address]  151 [facility]  P: [phone]  F: [phone] [x] [facility]: [phone]  F: [phone] [] 224 [facility]  One [facility]   [address]   P: [phone]  F: [phone]  [] 7170 [facility].   P: [phone]  F: [phone] [] 205 [facility]  [address]  P: [phone]  F: [phone] [] 224 [facility]  7[address] [facility]  [place]: [phone]  F: [phone] [] 1 [facility] [address]  [place]: [phone]  F: [phone]      Physical Therapy Daily Treatment Note    Date:  7/10/2023  Patient Name:  Neema Fay    :  1965  MRN: [id number]  Physician: Geoffrey Bedolla MD                              Insurance: [address] [place] [address] Diagnosis: M75.02 (ICD-10-CM) - Adhesive capsulitis of left shoulder                  Rehab Codes: M75.02 (ICD-10-CM) - Adhesive capsulitis of left shoulder  M25.512; M62.81  Onset Date: 23                 Next 's appt: 23   Visit# / total visits:      Cancels/No Shows: 0/0    Subjective:    Pain:  [x] Yes  [] No Location: L GHJ- generalized  Pain Rating: (0-10 scale) (unrated)/10 at rest  Pain altered Tx:  [x] No  [] Yes  Action: N/A  Comments: Pt reports she thinks the steroids are kicking in.  Denies much pain upon

## 2023-07-12 ENCOUNTER — HOSPITAL ENCOUNTER (OUTPATIENT)
Dept: PHYSICAL THERAPY | Facility: CLINIC | Age: 58
Setting detail: THERAPIES SERIES
Discharge: HOME | End: 2023-07-12
Payer: COMMERCIAL

## 2023-07-12 PROCEDURE — 97110 THERAPEUTIC EXERCISES: CPT

## 2023-07-12 NOTE — FLOWSHEET NOTE
[] [address]  4600 [facility].  [phone]  F: [phone] [] 204 [facility]  [address]   [address]  P: [phone]  F: [phone] [] [address]  151 [facility]  P: [phone]  F: [phone] [x] Elvin Allison: [phone]  F: [phone] [] 224 [facility]  One [facility]   [address]   P: [phone]  F: [phone]  [] [address] [facility].   P: [phone]  F: [phone] [] 205 [facility]  [address] [facility]   [address]  P: [phone]  F: [phone] [] 224 [facility]  [address] [place] [address]  [place]: [phone]  F: [phone] [] 1 [facility] [address]  [place]: [phone]  F: [phone]      Physical Therapy Daily Treatment Note    Date:  2023  Patient Name:  Alexa Weber    :  1965  MRN: [id number]  Physician: Lorena Sullivan MD                              Insurance: [address] [place] [address] Diagnosis: M75.02 (ICD-10-CM) - Adhesive capsulitis of left shoulder                  Rehab Codes: M75.02 (ICD-10-CM) - Adhesive capsulitis of left shoulder  M25.512; M62.81  Onset Date: 23                 Next 's appt: 23 - possibly changing to 8/3  Visit# / total visits:      Cancels/No Shows: 0/0    Subjective:    Pain:  [x] Yes  [] No Location: L GHJ- generalized  Pain Rating: (0-10 scale) 1/10 posterior shoulder  Pain altered Tx:  [x] No  [] Yes  Action: N/A  Comments: Noticed new pain posterior shoulder from

## 2023-07-17 ENCOUNTER — HOSPITAL ENCOUNTER (OUTPATIENT)
Dept: PHYSICAL THERAPY | Facility: CLINIC | Age: 58
Setting detail: THERAPIES SERIES
Discharge: HOME | End: 2023-07-17
Payer: COMMERCIAL

## 2023-07-17 PROCEDURE — 97110 THERAPEUTIC EXERCISES: CPT

## 2023-07-17 NOTE — FLOWSHEET NOTE
[] [address]  4600 [facility].  [phone]  F: [phone] [] 204 [facility]  [address]   [address]  P: [phone]  F: [phone] [] [address]  151 [facility]  P: [phone]  F: [phone] [x] Elvin Allison: [phone]  F: [phone] [] 224 [facility]  One [facility]   [address]   P: [phone]  F: [phone]  [] [address] [place]   P: [phone]  F: [phone] [] 205 [facility]  [address]  P: [phone]  F: [phone] [] 224 [facility]  795 [facility]  [place]: [phone]  F: [phone] [] 1 [facility] [address]  [place]: [phone]  F: [phone]      Physical Therapy Daily Treatment Note    Date:  2023  Patient Name:  Suha Arroyo    :  1965  MRN: [id number]  Physician: Samina Greenwood MD                              Insurance: [address] [place] [address] Diagnosis: M75.02 (ICD-10-CM) - Adhesive capsulitis of left shoulder                  Rehab Codes: M75.02 (ICD-10-CM) - Adhesive capsulitis of left shoulder  M25.512; M62.81  Onset Date: 23                 Next 's appt: 8/3/23   Visit# / total visits: 15/24     Cancels/No Shows: 0/0    Subjective:    Pain:  [x] Yes  [] No Location: L GHJ- generalized  Pain Rating: (0-10 scale) 1/10 posterior shoulder  Pain altered Tx:  [x] No  [] Yes  Action: N/A  Comments: Relief with oral steroids.  Poor compliance to current HEP interventions

## 2023-07-19 ENCOUNTER — HOSPITAL ENCOUNTER (OUTPATIENT)
Dept: PHYSICAL THERAPY | Facility: CLINIC | Age: 58
Setting detail: THERAPIES SERIES
Discharge: HOME | End: 2023-07-19
Payer: COMMERCIAL

## 2023-07-19 PROCEDURE — 97110 THERAPEUTIC EXERCISES: CPT

## 2023-07-19 NOTE — FLOWSHEET NOTE
[] [address]  4600 [facility].  [phone]  F: [phone] [] 204 [facility]  [address]   [address]  P: [phone]  F: [phone] [] [address]  151 [facility]  P: [phone]  F: [phone] [x] Elvin Mauroie: [phone]  F: [phone] [] 224 [facility]  One [facility]   [address]   P: [phone]  F: [phone]  [] 7170 [facility].   P: [phone]  F: [phone] [] 205 [facility]  [address]  P: [phone]  F: [phone] [] 224 [facility]  [address] [place] [address]  [place]: [phone]  F: [phone] [] 1 [facility] [place] [facility] [address]  [place]: [phone]  F: [phone]      Physical Therapy Daily Treatment Note    Date:  2023  Patient Name:  Ishmael Santiago    :  1965  MRN: [id number]  Physician: Braydon Curry MD                              Insurance: [address] [place] [address] Diagnosis: M75.02 (ICD-10-CM) - Adhesive capsulitis of left shoulder                  Rehab Codes: M75.02 (ICD-10-CM) - Adhesive capsulitis of left shoulder  M25.512; M62.81  Onset Date: 23                 Next 's appt: 8/3/23   Visit# / total visits:      Cancels/No Shows: 0/0    Subjective:    Pain:  [] Yes  [x] No Location: L GHJ- generalized  Pain Rating: (0-10 scale) 0/10 posterior shoulder  Pain altered Tx:  [x] No  [] Yes  Action: N/A  Comments: No complaints after last sessions progressions.  No pain on arrival.

## 2023-07-26 ENCOUNTER — APPOINTMENT (OUTPATIENT)
Dept: PHYSICAL THERAPY | Facility: CLINIC | Age: 58
End: 2023-07-26
Payer: COMMERCIAL